# Patient Record
Sex: FEMALE
[De-identification: names, ages, dates, MRNs, and addresses within clinical notes are randomized per-mention and may not be internally consistent; named-entity substitution may affect disease eponyms.]

---

## 2021-11-30 ENCOUNTER — HOSPITAL ENCOUNTER (INPATIENT)
Dept: HOSPITAL 19 - LDR | Age: 31
LOS: 2 days | Discharge: HOME | End: 2021-12-02
Attending: OBSTETRICS & GYNECOLOGY | Admitting: OBSTETRICS & GYNECOLOGY
Payer: COMMERCIAL

## 2021-11-30 VITALS — SYSTOLIC BLOOD PRESSURE: 109 MMHG | DIASTOLIC BLOOD PRESSURE: 60 MMHG | HEART RATE: 75 BPM | TEMPERATURE: 97.5 F

## 2021-11-30 VITALS — HEART RATE: 60 BPM | DIASTOLIC BLOOD PRESSURE: 56 MMHG | SYSTOLIC BLOOD PRESSURE: 105 MMHG

## 2021-11-30 VITALS — TEMPERATURE: 97.5 F | SYSTOLIC BLOOD PRESSURE: 113 MMHG | HEART RATE: 65 BPM | DIASTOLIC BLOOD PRESSURE: 61 MMHG

## 2021-11-30 VITALS — SYSTOLIC BLOOD PRESSURE: 111 MMHG | HEART RATE: 63 BPM | DIASTOLIC BLOOD PRESSURE: 56 MMHG

## 2021-11-30 VITALS — HEART RATE: 71 BPM | SYSTOLIC BLOOD PRESSURE: 111 MMHG | DIASTOLIC BLOOD PRESSURE: 68 MMHG

## 2021-11-30 VITALS — DIASTOLIC BLOOD PRESSURE: 68 MMHG | SYSTOLIC BLOOD PRESSURE: 110 MMHG | HEART RATE: 87 BPM

## 2021-11-30 VITALS — HEART RATE: 77 BPM | SYSTOLIC BLOOD PRESSURE: 120 MMHG | DIASTOLIC BLOOD PRESSURE: 65 MMHG

## 2021-11-30 VITALS — HEART RATE: 80 BPM | DIASTOLIC BLOOD PRESSURE: 56 MMHG | SYSTOLIC BLOOD PRESSURE: 116 MMHG

## 2021-11-30 VITALS — HEART RATE: 80 BPM | SYSTOLIC BLOOD PRESSURE: 115 MMHG | DIASTOLIC BLOOD PRESSURE: 67 MMHG

## 2021-11-30 VITALS — DIASTOLIC BLOOD PRESSURE: 58 MMHG | SYSTOLIC BLOOD PRESSURE: 98 MMHG | HEART RATE: 75 BPM

## 2021-11-30 VITALS — TEMPERATURE: 97.4 F | HEART RATE: 60 BPM | SYSTOLIC BLOOD PRESSURE: 102 MMHG | DIASTOLIC BLOOD PRESSURE: 61 MMHG

## 2021-11-30 VITALS — BODY MASS INDEX: 30.12 KG/M2 | HEIGHT: 65.98 IN | WEIGHT: 187.39 LBS

## 2021-11-30 VITALS — SYSTOLIC BLOOD PRESSURE: 104 MMHG | HEART RATE: 67 BPM | DIASTOLIC BLOOD PRESSURE: 59 MMHG

## 2021-11-30 VITALS — HEART RATE: 63 BPM | DIASTOLIC BLOOD PRESSURE: 57 MMHG | SYSTOLIC BLOOD PRESSURE: 109 MMHG

## 2021-11-30 VITALS — SYSTOLIC BLOOD PRESSURE: 123 MMHG | DIASTOLIC BLOOD PRESSURE: 67 MMHG | HEART RATE: 81 BPM

## 2021-11-30 VITALS — DIASTOLIC BLOOD PRESSURE: 56 MMHG | SYSTOLIC BLOOD PRESSURE: 112 MMHG | HEART RATE: 86 BPM

## 2021-11-30 VITALS — SYSTOLIC BLOOD PRESSURE: 115 MMHG | DIASTOLIC BLOOD PRESSURE: 63 MMHG | HEART RATE: 63 BPM

## 2021-11-30 VITALS — HEART RATE: 62 BPM | SYSTOLIC BLOOD PRESSURE: 99 MMHG | DIASTOLIC BLOOD PRESSURE: 54 MMHG

## 2021-11-30 VITALS — TEMPERATURE: 98.1 F | DIASTOLIC BLOOD PRESSURE: 57 MMHG | SYSTOLIC BLOOD PRESSURE: 129 MMHG | HEART RATE: 81 BPM

## 2021-11-30 VITALS — DIASTOLIC BLOOD PRESSURE: 59 MMHG | HEART RATE: 74 BPM | SYSTOLIC BLOOD PRESSURE: 107 MMHG

## 2021-11-30 VITALS — HEART RATE: 76 BPM | SYSTOLIC BLOOD PRESSURE: 108 MMHG | DIASTOLIC BLOOD PRESSURE: 64 MMHG

## 2021-11-30 VITALS — DIASTOLIC BLOOD PRESSURE: 60 MMHG | SYSTOLIC BLOOD PRESSURE: 106 MMHG | HEART RATE: 75 BPM

## 2021-11-30 VITALS — HEART RATE: 62 BPM | SYSTOLIC BLOOD PRESSURE: 103 MMHG | DIASTOLIC BLOOD PRESSURE: 55 MMHG

## 2021-11-30 VITALS — HEART RATE: 78 BPM | SYSTOLIC BLOOD PRESSURE: 119 MMHG | DIASTOLIC BLOOD PRESSURE: 61 MMHG

## 2021-11-30 VITALS — HEART RATE: 80 BPM | DIASTOLIC BLOOD PRESSURE: 59 MMHG | SYSTOLIC BLOOD PRESSURE: 103 MMHG

## 2021-11-30 VITALS — SYSTOLIC BLOOD PRESSURE: 106 MMHG | DIASTOLIC BLOOD PRESSURE: 60 MMHG | HEART RATE: 75 BPM

## 2021-11-30 VITALS — SYSTOLIC BLOOD PRESSURE: 105 MMHG | DIASTOLIC BLOOD PRESSURE: 50 MMHG | HEART RATE: 67 BPM

## 2021-11-30 VITALS — SYSTOLIC BLOOD PRESSURE: 93 MMHG | HEART RATE: 68 BPM | DIASTOLIC BLOOD PRESSURE: 52 MMHG

## 2021-11-30 VITALS — DIASTOLIC BLOOD PRESSURE: 59 MMHG | HEART RATE: 64 BPM | SYSTOLIC BLOOD PRESSURE: 104 MMHG

## 2021-11-30 VITALS — DIASTOLIC BLOOD PRESSURE: 75 MMHG | SYSTOLIC BLOOD PRESSURE: 118 MMHG | HEART RATE: 81 BPM

## 2021-11-30 VITALS — SYSTOLIC BLOOD PRESSURE: 104 MMHG | DIASTOLIC BLOOD PRESSURE: 62 MMHG | HEART RATE: 77 BPM

## 2021-11-30 VITALS — SYSTOLIC BLOOD PRESSURE: 118 MMHG | DIASTOLIC BLOOD PRESSURE: 65 MMHG | HEART RATE: 72 BPM

## 2021-11-30 VITALS — DIASTOLIC BLOOD PRESSURE: 54 MMHG | HEART RATE: 73 BPM | SYSTOLIC BLOOD PRESSURE: 91 MMHG

## 2021-11-30 VITALS — SYSTOLIC BLOOD PRESSURE: 109 MMHG | HEART RATE: 70 BPM | DIASTOLIC BLOOD PRESSURE: 60 MMHG

## 2021-11-30 VITALS — SYSTOLIC BLOOD PRESSURE: 110 MMHG | HEART RATE: 67 BPM | DIASTOLIC BLOOD PRESSURE: 58 MMHG

## 2021-11-30 VITALS — SYSTOLIC BLOOD PRESSURE: 111 MMHG | DIASTOLIC BLOOD PRESSURE: 56 MMHG | HEART RATE: 72 BPM

## 2021-11-30 VITALS — HEART RATE: 79 BPM | DIASTOLIC BLOOD PRESSURE: 64 MMHG | SYSTOLIC BLOOD PRESSURE: 121 MMHG

## 2021-11-30 VITALS — HEART RATE: 66 BPM | DIASTOLIC BLOOD PRESSURE: 67 MMHG | SYSTOLIC BLOOD PRESSURE: 114 MMHG

## 2021-11-30 VITALS — SYSTOLIC BLOOD PRESSURE: 116 MMHG | DIASTOLIC BLOOD PRESSURE: 56 MMHG | HEART RATE: 73 BPM

## 2021-11-30 VITALS — SYSTOLIC BLOOD PRESSURE: 102 MMHG | HEART RATE: 65 BPM | DIASTOLIC BLOOD PRESSURE: 66 MMHG

## 2021-11-30 VITALS — HEART RATE: 60 BPM | SYSTOLIC BLOOD PRESSURE: 100 MMHG | DIASTOLIC BLOOD PRESSURE: 56 MMHG

## 2021-11-30 VITALS — HEART RATE: 66 BPM | DIASTOLIC BLOOD PRESSURE: 64 MMHG | SYSTOLIC BLOOD PRESSURE: 111 MMHG

## 2021-11-30 VITALS — HEART RATE: 60 BPM | DIASTOLIC BLOOD PRESSURE: 60 MMHG | SYSTOLIC BLOOD PRESSURE: 108 MMHG

## 2021-11-30 VITALS — HEART RATE: 77 BPM | DIASTOLIC BLOOD PRESSURE: 60 MMHG | SYSTOLIC BLOOD PRESSURE: 106 MMHG

## 2021-11-30 VITALS — SYSTOLIC BLOOD PRESSURE: 118 MMHG | HEART RATE: 69 BPM | DIASTOLIC BLOOD PRESSURE: 58 MMHG

## 2021-11-30 VITALS — DIASTOLIC BLOOD PRESSURE: 58 MMHG | HEART RATE: 68 BPM | SYSTOLIC BLOOD PRESSURE: 108 MMHG

## 2021-11-30 VITALS — DIASTOLIC BLOOD PRESSURE: 59 MMHG | SYSTOLIC BLOOD PRESSURE: 107 MMHG | HEART RATE: 74 BPM

## 2021-11-30 VITALS — DIASTOLIC BLOOD PRESSURE: 56 MMHG | SYSTOLIC BLOOD PRESSURE: 110 MMHG | HEART RATE: 67 BPM

## 2021-11-30 VITALS — HEART RATE: 74 BPM | SYSTOLIC BLOOD PRESSURE: 115 MMHG | DIASTOLIC BLOOD PRESSURE: 67 MMHG

## 2021-11-30 VITALS — HEART RATE: 66 BPM | SYSTOLIC BLOOD PRESSURE: 118 MMHG | DIASTOLIC BLOOD PRESSURE: 58 MMHG

## 2021-11-30 VITALS — TEMPERATURE: 97.7 F

## 2021-11-30 VITALS — HEART RATE: 70 BPM | DIASTOLIC BLOOD PRESSURE: 56 MMHG | SYSTOLIC BLOOD PRESSURE: 105 MMHG

## 2021-11-30 VITALS — HEART RATE: 76 BPM | SYSTOLIC BLOOD PRESSURE: 118 MMHG | DIASTOLIC BLOOD PRESSURE: 56 MMHG

## 2021-11-30 VITALS — HEART RATE: 83 BPM | SYSTOLIC BLOOD PRESSURE: 111 MMHG | TEMPERATURE: 98.1 F | DIASTOLIC BLOOD PRESSURE: 60 MMHG

## 2021-11-30 VITALS — HEART RATE: 70 BPM | SYSTOLIC BLOOD PRESSURE: 116 MMHG | DIASTOLIC BLOOD PRESSURE: 57 MMHG

## 2021-11-30 VITALS — HEART RATE: 78 BPM

## 2021-11-30 VITALS — DIASTOLIC BLOOD PRESSURE: 66 MMHG | HEART RATE: 68 BPM | SYSTOLIC BLOOD PRESSURE: 110 MMHG

## 2021-11-30 VITALS — DIASTOLIC BLOOD PRESSURE: 59 MMHG | HEART RATE: 70 BPM | SYSTOLIC BLOOD PRESSURE: 102 MMHG

## 2021-11-30 VITALS — SYSTOLIC BLOOD PRESSURE: 96 MMHG | DIASTOLIC BLOOD PRESSURE: 52 MMHG | HEART RATE: 76 BPM

## 2021-11-30 DIAGNOSIS — Z3A.38: ICD-10-CM

## 2021-11-30 DIAGNOSIS — O36.5930: Primary | ICD-10-CM

## 2021-11-30 LAB
BASOPHILS # BLD: 0.1 K/MM3 (ref 0–0.2)
BASOPHILS NFR BLD AUTO: 0.5 % (ref 0–2)
EOSINOPHIL # BLD: 0.4 K/MM3 (ref 0–0.7)
EOSINOPHIL NFR BLD: 3.3 % (ref 0–4)
ERYTHROCYTE [DISTWIDTH] IN BLOOD BY AUTOMATED COUNT: 13.2 % (ref 11.5–14.5)
GRANULOCYTES # BLD AUTO: 72 % (ref 42.2–75.2)
HCT VFR BLD AUTO: 32.7 % (ref 37–47)
HGB BLD-MCNC: 10.5 G/DL (ref 12.5–16)
LYMPHOCYTES # BLD: 2 K/MM3 (ref 1.2–3.4)
LYMPHOCYTES NFR BLD: 16.9 % (ref 20–51)
MCH RBC QN AUTO: 27 PG (ref 27–31)
MCHC RBC AUTO-ENTMCNC: 32 G/DL (ref 33–37)
MCV RBC AUTO: 83 FL (ref 80–100)
MONOCYTES # BLD: 0.8 K/MM3 (ref 0.1–0.6)
MONOCYTES NFR BLD AUTO: 6.6 % (ref 1.7–9.3)
NEUTROPHILS # BLD: 8.3 K/MM3 (ref 1.4–6.5)
PLATELET # BLD AUTO: 271 K/MM3 (ref 130–400)
PMV BLD AUTO: 11.2 FL (ref 7.4–10.4)
RBC # BLD AUTO: 3.92 M/MM3 (ref 4.1–5.3)

## 2021-11-30 PROCEDURE — 10907ZC DRAINAGE OF AMNIOTIC FLUID, THERAPEUTIC FROM PRODUCTS OF CONCEPTION, VIA NATURAL OR ARTIFICIAL OPENING: ICD-10-PCS | Performed by: OBSTETRICS & GYNECOLOGY

## 2021-11-30 PROCEDURE — 3E033VJ INTRODUCTION OF OTHER HORMONE INTO PERIPHERAL VEIN, PERCUTANEOUS APPROACH: ICD-10-PCS | Performed by: OBSTETRICS & GYNECOLOGY

## 2021-11-30 NOTE — NUR
Subtle FHR decels. Ctx tracing intermittently. Difficult to determine fhr
decel onset. RN at bedside adjusting TOCO.

## 2021-11-30 NOTE — NUR
HAS FINISHED NURSING BABY, PADS CHANGED, PANTIES ON
OUT OF BED, LEFT LEG STILL A LITTLE NUMB. PIVOTS TO
WHEELCHAIR THEN TRANSFERRED TO ROOM 207 PER WHEELCHAIR WITH INFANT AND

## 2021-11-30 NOTE — NUR
1020-Patient requesting epidural at this time. Discussed plan of care and
prepared patient. IV fluid bolus started.
 
 
1036-SVEN Saldaña at bedside. Patient positioned for epidural placement.
Difficulty tracing FHR due to maternal position.
 
1041-Single shot given by SVEN Saldaña. Patient tolerated well, no adverse
reactions noted.
 
1050-Patient repositioned in bed. EFM/TOCO readjusted. Plan of care discussed.

## 2021-11-30 NOTE — NUR
0630-, 38.0, here for scheduled induction for IUGR. Ambulates to LDR6 with
spouse. Oriented to room. Changed into gown. Patient arvind LOF,VB,regular ctx,
or decreased fetal movement. EFM/TOCO explained and applied.
 
0650-IV started. Blood obtained and to lab. IV fluids started. Assessments
completed. Consents explained and signed. Plan of care discussed.
 
0710- at bedside assessing FHR and patient. SVE /-2. AROM at
this time. Plan of care discussed, patient verbalizes understanding.
 
0718-Pitocin explained to patient, patient verbalizes understanding. Pitocin
started at 2 mu per protocol. No adverse reactions at this time.

## 2021-11-30 NOTE — NUR
1230-Difficulty tracing ctx due to maternal position. RN at bedside adjusting
TOCO and repositioning patient.

## 2021-11-30 NOTE — NUR
- Dr. Randle on unit and reviews strips. Provider to patient bedside and
reviews POC. SVE per Dr. Randle /+1. Suha care and patient repositioned.
 
- Patient calls RN to bedside and reports increased rectal pressure. SVE
by this RN C/+2. Dr. Randle remains at nurses desk and notified.
 
- Dr. Randle to patient room. Patient repositioned in footplates and
instructed on pushing with contractions.
 
- Spontaneous vaginal delivery of viable female  over intact
perineum.  Body cord noted.  Nares and mouth bulb suctioned by Dr. Randle.
Pitocin paused.
 
- Cord clamped x2 and cut. [place] to mother's chest where dried and
stimulated by nursery RN. Care of  assumed by JEFFERSON Medrano RN.
 
- Spontaneous and intact delivery of placenta. Pitocin to 333ml/hr per
orders. Fundus firm, midline, and bleeding minimal. Suha care provided, pads
changed, and patient repositioned in bed. Plan of care and safety precautions
reviewed. See doctor dictation, anesthesia record, and nurses notes.
 
- Report to Neyda ZAPATA who assumes care of patient.

## 2021-11-30 NOTE — NUR
1140: THIS RN TOOK OVER CARE OF PT. FOR LUNCH RELIEF
1210: MAIN RN (RYLAN) RESUMED CARE OF PT. CARE RELINQUISHED AT THIS TIME

## 2021-12-01 VITALS — DIASTOLIC BLOOD PRESSURE: 63 MMHG | TEMPERATURE: 98 F | HEART RATE: 68 BPM | SYSTOLIC BLOOD PRESSURE: 111 MMHG

## 2021-12-01 VITALS — SYSTOLIC BLOOD PRESSURE: 116 MMHG | TEMPERATURE: 97.4 F | DIASTOLIC BLOOD PRESSURE: 64 MMHG | HEART RATE: 69 BPM

## 2021-12-01 VITALS — DIASTOLIC BLOOD PRESSURE: 62 MMHG | SYSTOLIC BLOOD PRESSURE: 112 MMHG | TEMPERATURE: 98.1 F | HEART RATE: 68 BPM

## 2021-12-01 VITALS — HEART RATE: 75 BPM | SYSTOLIC BLOOD PRESSURE: 105 MMHG | DIASTOLIC BLOOD PRESSURE: 48 MMHG | TEMPERATURE: 97.9 F

## 2021-12-01 VITALS — SYSTOLIC BLOOD PRESSURE: 107 MMHG | TEMPERATURE: 97.3 F | HEART RATE: 72 BPM | DIASTOLIC BLOOD PRESSURE: 59 MMHG

## 2021-12-01 VITALS — TEMPERATURE: 98 F | HEART RATE: 68 BPM | DIASTOLIC BLOOD PRESSURE: 63 MMHG | SYSTOLIC BLOOD PRESSURE: 111 MMHG

## 2021-12-01 NOTE — NUR
Initial visit; Parents thanked  for offering congratulations and God's
blessings for the birth of their daughter.  thanked family for
choosing [facility].

## 2021-12-02 VITALS — TEMPERATURE: 97.5 F | DIASTOLIC BLOOD PRESSURE: 68 MMHG | SYSTOLIC BLOOD PRESSURE: 108 MMHG | HEART RATE: 68 BPM

## 2021-12-02 VITALS — HEART RATE: 70 BPM | DIASTOLIC BLOOD PRESSURE: 68 MMHG | SYSTOLIC BLOOD PRESSURE: 118 MMHG | TEMPERATURE: 97.8 F

## 2021-12-02 NOTE — NUR
DISCHARGE TEACHING COMPLETED. PATIENT EDUCATED ON FOLLOW UP APPOINTMENT AND
PRESCRIPTIONS. QUESTIONS INVITED AND ANSWERED.